# Patient Record
Sex: FEMALE | Race: WHITE | NOT HISPANIC OR LATINO | Employment: UNEMPLOYED | URBAN - METROPOLITAN AREA
[De-identification: names, ages, dates, MRNs, and addresses within clinical notes are randomized per-mention and may not be internally consistent; named-entity substitution may affect disease eponyms.]

---

## 2020-11-09 ENCOUNTER — HOSPITAL ENCOUNTER (EMERGENCY)
Facility: HOSPITAL | Age: 45
Discharge: HOME | End: 2020-11-09
Attending: EMERGENCY MEDICINE
Payer: COMMERCIAL

## 2020-11-09 VITALS
WEIGHT: 130 LBS | HEART RATE: 90 BPM | SYSTOLIC BLOOD PRESSURE: 148 MMHG | BODY MASS INDEX: 23.92 KG/M2 | DIASTOLIC BLOOD PRESSURE: 90 MMHG | OXYGEN SATURATION: 100 % | HEIGHT: 62 IN | TEMPERATURE: 98 F | RESPIRATION RATE: 1 BRPM

## 2020-11-09 DIAGNOSIS — K59.00 CONSTIPATION: ICD-10-CM

## 2020-11-09 DIAGNOSIS — Z87.898 HISTORY OF SEIZURES: ICD-10-CM

## 2020-11-09 DIAGNOSIS — R21 RASH: Primary | ICD-10-CM

## 2020-11-09 LAB
ALBUMIN SERPL BCP-MCNC: 3.8 G/DL (ref 3.5–5.2)
ALP SERPL-CCNC: 95 U/L (ref 55–135)
ALT SERPL W/O P-5'-P-CCNC: 18 U/L (ref 10–44)
AMPHET+METHAMPHET UR QL: NORMAL
ANION GAP SERPL CALC-SCNC: 9 MMOL/L (ref 8–16)
AST SERPL-CCNC: 21 U/L (ref 10–40)
BACTERIA #/AREA URNS HPF: NORMAL /HPF
BARBITURATES UR QL SCN>200 NG/ML: NEGATIVE
BASOPHILS # BLD AUTO: 0.04 K/UL (ref 0–0.2)
BASOPHILS NFR BLD: 0.6 % (ref 0–1.9)
BENZODIAZ UR QL SCN>200 NG/ML: NEGATIVE
BILIRUB SERPL-MCNC: 0.6 MG/DL (ref 0.1–1)
BILIRUB UR QL STRIP: NEGATIVE
BNP SERPL-MCNC: 30 PG/ML (ref 0–99)
BUN SERPL-MCNC: 15 MG/DL (ref 6–20)
BZE UR QL SCN: NEGATIVE
CALCIUM SERPL-MCNC: 9 MG/DL (ref 8.7–10.5)
CANNABINOIDS UR QL SCN: NEGATIVE
CHLORIDE SERPL-SCNC: 106 MMOL/L (ref 95–110)
CLARITY UR: CLEAR
CO2 SERPL-SCNC: 26 MMOL/L (ref 23–29)
COLOR UR: ABNORMAL
CREAT SERPL-MCNC: 0.8 MG/DL (ref 0.5–1.4)
CREAT UR-MCNC: 41.3 MG/DL (ref 15–325)
CTP QC/QA: YES
DIFFERENTIAL METHOD: NORMAL
EOSINOPHIL # BLD AUTO: 0.1 K/UL (ref 0–0.5)
EOSINOPHIL NFR BLD: 1.3 % (ref 0–8)
ERYTHROCYTE [DISTWIDTH] IN BLOOD BY AUTOMATED COUNT: 13.6 % (ref 11.5–14.5)
EST. GFR  (AFRICAN AMERICAN): >60 ML/MIN/1.73 M^2
EST. GFR  (NON AFRICAN AMERICAN): >60 ML/MIN/1.73 M^2
ETHANOL SERPL-MCNC: <10 MG/DL
GLUCOSE SERPL-MCNC: 85 MG/DL (ref 70–110)
GLUCOSE UR QL STRIP: NEGATIVE
HCT VFR BLD AUTO: 39.2 % (ref 37–48.5)
HGB BLD-MCNC: 12.8 G/DL (ref 12–16)
HGB UR QL STRIP: ABNORMAL
IMM GRANULOCYTES # BLD AUTO: 0.03 K/UL (ref 0–0.04)
IMM GRANULOCYTES NFR BLD AUTO: 0.4 % (ref 0–0.5)
KETONES UR QL STRIP: NEGATIVE
LEUKOCYTE ESTERASE UR QL STRIP: NEGATIVE
LYMPHOCYTES # BLD AUTO: 2.1 K/UL (ref 1–4.8)
LYMPHOCYTES NFR BLD: 29.3 % (ref 18–48)
MCH RBC QN AUTO: 28.8 PG (ref 27–31)
MCHC RBC AUTO-ENTMCNC: 32.7 G/DL (ref 32–36)
MCV RBC AUTO: 88 FL (ref 82–98)
METHADONE UR QL SCN>300 NG/ML: NEGATIVE
MICROSCOPIC COMMENT: NORMAL
MONOCYTES # BLD AUTO: 0.6 K/UL (ref 0.3–1)
MONOCYTES NFR BLD: 8.8 % (ref 4–15)
NEUTROPHILS # BLD AUTO: 4.3 K/UL (ref 1.8–7.7)
NEUTROPHILS NFR BLD: 59.6 % (ref 38–73)
NITRITE UR QL STRIP: NEGATIVE
NRBC BLD-RTO: 0 /100 WBC
OPIATES UR QL SCN: NEGATIVE
PCP UR QL SCN>25 NG/ML: NEGATIVE
PH UR STRIP: 6 [PH] (ref 5–8)
PLATELET # BLD AUTO: 230 K/UL (ref 150–350)
PMV BLD AUTO: 10.3 FL (ref 9.2–12.9)
POCT GLUCOSE: 87 MG/DL (ref 70–110)
POTASSIUM SERPL-SCNC: 3.6 MMOL/L (ref 3.5–5.1)
PROT SERPL-MCNC: 6.3 G/DL (ref 6–8.4)
PROT UR QL STRIP: NEGATIVE
RBC # BLD AUTO: 4.45 M/UL (ref 4–5.4)
RBC #/AREA URNS HPF: 1 /HPF (ref 0–4)
SARS-COV-2 RDRP RESP QL NAA+PROBE: NEGATIVE
SODIUM SERPL-SCNC: 141 MMOL/L (ref 136–145)
SP GR UR STRIP: 1 (ref 1–1.03)
TOXICOLOGY INFORMATION: NORMAL
TROPONIN I SERPL DL<=0.01 NG/ML-MCNC: <0.006 NG/ML (ref 0–0.03)
URN SPEC COLLECT METH UR: ABNORMAL
UROBILINOGEN UR STRIP-ACNC: NEGATIVE EU/DL
WBC # BLD AUTO: 7.16 K/UL (ref 3.9–12.7)

## 2020-11-09 PROCEDURE — 84484 ASSAY OF TROPONIN QUANT: CPT

## 2020-11-09 PROCEDURE — 83880 ASSAY OF NATRIURETIC PEPTIDE: CPT

## 2020-11-09 PROCEDURE — 82962 GLUCOSE BLOOD TEST: CPT

## 2020-11-09 PROCEDURE — 99285 EMERGENCY DEPT VISIT HI MDM: CPT | Mod: 25

## 2020-11-09 PROCEDURE — 80307 DRUG TEST PRSMV CHEM ANLYZR: CPT

## 2020-11-09 PROCEDURE — 81000 URINALYSIS NONAUTO W/SCOPE: CPT | Mod: 59

## 2020-11-09 PROCEDURE — 80320 DRUG SCREEN QUANTALCOHOLS: CPT

## 2020-11-09 PROCEDURE — 80053 COMPREHEN METABOLIC PANEL: CPT

## 2020-11-09 PROCEDURE — 85025 COMPLETE CBC W/AUTO DIFF WBC: CPT

## 2020-11-09 PROCEDURE — 51798 US URINE CAPACITY MEASURE: CPT

## 2020-11-09 PROCEDURE — 96374 THER/PROPH/DIAG INJ IV PUSH: CPT

## 2020-11-09 PROCEDURE — 63600175 PHARM REV CODE 636 W HCPCS: Performed by: NURSE PRACTITIONER

## 2020-11-09 PROCEDURE — U0002 COVID-19 LAB TEST NON-CDC: HCPCS | Performed by: NURSE PRACTITIONER

## 2020-11-09 RX ORDER — LISINOPRIL 10 MG/1
10 TABLET ORAL DAILY
COMMUNITY

## 2020-11-09 RX ORDER — LORAZEPAM 2 MG/ML
1 INJECTION INTRAMUSCULAR
Status: COMPLETED | OUTPATIENT
Start: 2020-11-09 | End: 2020-11-09

## 2020-11-09 RX ADMIN — LORAZEPAM 1 MG: 2 INJECTION INTRAMUSCULAR; INTRAVENOUS at 03:11

## 2020-11-09 NOTE — FIRST PROVIDER EVALUATION
Emergency Department TeleTriage Encounter Note      CHIEF COMPLAINT    Chief Complaint   Patient presents with    General Illness     Pt c/o shortness of breathe, head pain, neck pain, body weakness feel like she is going tp pass put secondary a parasite in patients head causing illness. Pt has been dealing with this for about 1 yr and has not been getting better. some partient information is being sent via fax line concerning patient Dx.        VITAL SIGNS   Initial Vitals [11/09/20 1428]   BP Pulse Resp Temp SpO2   (!) 157/92 91 18 98.5 °F (36.9 °C) 98 %      MAP       --            ALLERGIES    Review of patient's allergies indicates:   Allergen Reactions    Azithromycin      Shaking, hives.        PROVIDER TRIAGE NOTE  This is a teletriage evaluation of a 45 y.o. female presenting to the ED with c/o shortness of breath, head pain, and generalized weakness. Reports that she has a parasite. Orders placed by provider at the facility. Care will be transferred to an alternate provider when patient is roomed for a full evaluation. Any additional orders and the final disposition will be determined by that provider.         ORDERS  Labs Reviewed   CBC W/ AUTO DIFFERENTIAL   COMPREHENSIVE METABOLIC PANEL   B-TYPE NATRIURETIC PEPTIDE   TROPONIN I   URINALYSIS, REFLEX TO URINE CULTURE       ED Orders (720h ago, onward)    Start Ordered     Status Ordering Provider    11/09/20 1437 11/09/20 1436  Insert peripheral IV  Continuous      Ordered JESSICA ACEVEDO    11/09/20 1437 11/09/20 1436  CBC auto differential  STAT  Collect    Ordered JESSICA ACEVEDO    11/09/20 1437 11/09/20 1436  Comprehensive metabolic panel  STAT  Collect    Ordered JESSICA ACEVEDO    11/09/20 1437 11/09/20 1436  Brain natriuretic peptide  STAT  Collect    Ordered JESSICA ACEVEDO    11/09/20 1437 11/09/20 1436  Troponin I  STAT  Collect    Ordered JESSICA ACEVEDO    11/09/20 1437 11/09/20 1436  Urinalysis, Reflex to Urine Culture Urine, Clean Catch  STAT       Ordered JESSICA ACEVEDO    11/09/20 1434 11/09/20 1436  CT Head Without Contrast  1 time imaging      Ordered JESSICA ACEVEDO            Virtual Visit Note: The provider triage portion of this emergency department evaluation and documentation was performed via [employer], a HIPAA-compliant telemedicine application, in concert with a tele-presenter in the room. A face to face patient evaluation with one of my colleagues will occur once the patient is placed in an emergency department room.      DISCLAIMER: This note was prepared with [employer] voice recognition transcription software. Garbled syntax, mangled pronouns, and other bizarre constructions may be attributed to that software system.

## 2020-11-09 NOTE — ED NOTES
Pt coming in presenting with gen weakness, malaise, nausea, weight loss. Pt advised all of its due to a parasite in her head and traveling around her body that she has been dealing with for a couple months. Pt does tubbing and mudding a lot and feels playing in that type of water could have caused the parasites. Pt states she feels the around crawling and was seen by another physician.

## 2020-11-10 NOTE — DISCHARGE INSTRUCTIONS
Take all meds as prescribed.  Follow up with infectious disease.  Miralax 17gm in water once a day to reduce constipation.  Return to the Emergency department for any worsening or failure to improve, otherwise follow up with your primary care provider.

## 2020-11-10 NOTE — ED PROVIDER NOTES
Encounter Date: 11/9/2020       History     Chief Complaint   Patient presents with    General Illness     Pt c/o shortness of breathe, head pain, neck pain, body weakness feel like she is going tp pass put secondary a parasite in patients head causing illness. Pt has been dealing with this for about 1 yr and has not been getting better. some partient information is being sent via fax line concerning patient Dx.      HPI   Patient is a 45-year-old female who presents with multiple complaints.  I was originally called to the room because the patient had reported that she felt as though she might have a seizure.  There is a documented history of seizures.  On arrival the patient was on her left side lying.  She reported that she felt as though she might have a seizure secondary to parasites that she felt have been present for approximately 1 year.  She reports that she has headache, body weakness, inability to urinate or defecate, skin lesions secondary to this parasite.  She reports that she has had many medical encounters without relief. 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     Review of patient's allergies indicates:   Allergen Reactions    Azithromycin      Shaking, hives.      Past Medical History:   Diagnosis Date    Hypertension     Seizures      Past Surgical History:   Procedure Laterality Date    CHOLECYSTECTOMY      HYSTERECTOMY      TONSILLECTOMY       History reviewed. No pertinent family history.  Social History     Tobacco Use    Smoking status: Current Some Day Smoker     Packs/day: 0.50     Types: Cigarettes   Substance Use Topics    Alcohol use: Not Currently    Drug use: Yes     Types: Marijuana, Methamphetamines     Comment: years ago      Review of Systems   Constitutional: Positive for chills and fever. Negative for fatigue.   HENT: Negative for congestion, ear discharge, ear pain, postnasal drip, rhinorrhea, sinus  pressure, sneezing, sore throat and voice change.    Eyes: Negative for discharge and itching.   Respiratory: Negative for cough, shortness of breath and wheezing.    Cardiovascular: Negative for chest pain, palpitations and leg swelling.   Gastrointestinal: Positive for abdominal pain. Negative for constipation, diarrhea, nausea and vomiting.        Inability to urinate or defecate   Endocrine: Negative for polydipsia, polyphagia and polyuria.   Genitourinary: Negative for dysuria, frequency, hematuria, urgency, vaginal bleeding, vaginal discharge and vaginal pain.   Musculoskeletal: Negative for arthralgias and myalgias.   Skin: Positive for rash. Negative for wound.   Neurological: Positive for seizures and headaches. Negative for dizziness, syncope, weakness and numbness.   Hematological: Negative for adenopathy. Does not bruise/bleed easily.   Psychiatric/Behavioral: Positive for sleep disturbance. Negative for self-injury and suicidal ideas. The patient is not nervous/anxious.        Physical Exam     Initial Vitals [11/09/20 1428]   BP Pulse Resp Temp SpO2   (!) 157/92 91 18 98.5 °F (36.9 °C) 98 %      MAP       --         Physical Exam    Nursing note and vitals reviewed.  Constitutional: She appears well-developed and well-nourished.   HENT:   Head: Normocephalic and atraumatic.   Right Ear: External ear normal.   Left Ear: External ear normal.   Nose: Nose normal.   Eyes: Conjunctivae and EOM are normal. Pupils are equal, round, and reactive to light. Right eye exhibits no discharge. Left eye exhibits no discharge.   Neck: Normal range of motion.   Cardiovascular: Regular rhythm, S1 normal, S2 normal and normal heart sounds. Exam reveals no gallop.    No murmur heard.  Pulmonary/Chest: Effort normal and breath sounds normal. No respiratory distress. She has no decreased breath sounds. She has no wheezes. She has no rhonchi. She has no rales.   Abdominal: Soft. Normal appearance and bowel sounds are normal.  She exhibits no distension. There is no abdominal tenderness.   Genitourinary: There is no rash or lesion on the right labia. There is no rash or lesion on the left labia.    Genitourinary Comments: External genitalia and anus examined by me with ARIANNA Jacobsen as chaperone.     Musculoskeletal: Normal range of motion.   Neurological: She is alert and oriented to person, place, and time.   Skin: Skin is dry. Capillary refill takes less than 2 seconds.         ED Course   Procedures  Labs Reviewed   URINALYSIS, REFLEX TO URINE CULTURE - Abnormal; Notable for the following components:       Result Value    Occult Blood UA 1+ (*)     All other components within normal limits    Narrative:     Specimen Source->Urine   CBC W/ AUTO DIFFERENTIAL   COMPREHENSIVE METABOLIC PANEL   B-TYPE NATRIURETIC PEPTIDE   TROPONIN I   URINALYSIS MICROSCOPIC    Narrative:     Specimen Source->Urine   DRUG SCREEN PANEL, URINE EMERGENCY    Narrative:     Specimen Source->Urine   ALCOHOL,MEDICAL (ETHANOL)   SARS-COV-2 RDRP GENE   POCT GLUCOSE          Imaging Results          X-Ray Abdomen Flat And Erect (Final result)  Result time 11/09/20 18:18:21    Final result by Silvio Limon MD (11/09/20 18:18:21)                 Impression:      Nonobstructive bowel gas pattern.  Moderate stool seen within the colon.      Electronically signed by: Silvio Limon MD  Date:    11/09/2020  Time:    18:18             Narrative:    EXAMINATION:  XR ABDOMEN FLAT AND ERECT    CLINICAL HISTORY:  Constipation, unspecified    TECHNIQUE:  Flat and erect AP views of the abdomen were performed.    COMPARISON:  None    FINDINGS:  Nonspecific bowel gas pattern.  No evidence to suggest obstruction.  No free air identified.  Moderate volume retained stool is seen within the colon, more prominent within the right colon.  Cholecystectomy clips are seen.  Partially visualized lung bases are clear.                               CT Head Without Contrast (Final result)   Result time 11/09/20 15:08:12    Final result by Manpreet Meyers MD (11/09/20 15:08:12)                 Impression:      No acute large vascular territory infarct or intracranial hemorrhage identified.  Further evaluation/follow-up as warranted.      Electronically signed by: Manpreet Meyers MD  Date:    11/09/2020  Time:    15:08             Narrative:    EXAMINATION:  CT HEAD WITHOUT CONTRAST    CLINICAL HISTORY:  Syncope, simple, abnormal neuro exam;Reports hx of parasite (cysticercosis?);    TECHNIQUE:  Low dose axial CT images obtained throughout the head without intravenous contrast. Sagittal and coronal reconstructions were performed.    COMPARISON:  None.    FINDINGS:  Intracranial compartment: Brain appears normally formed.    Ventricles and sulci are normal in size for age without evidence of hydrocephalus. No extra-axial blood or fluid collections.    The brain parenchyma appears normal. No parenchymal mass, hemorrhage, edema or major vascular distribution infarct.    Skull/extracranial contents (limited evaluation): No fracture. Mastoid air cells and paranasal sinuses are essentially clear.  Imaged portions of the orbits are within normal limits.                                                   ED Course as of Nov 10 1418   Mon Nov 09, 2020   1502 BP(!): 157/92 [VC]   1502 Temp: 98.5 °F (36.9 °C) [VC]   1502 Temp src: Oral [VC]   1502 Pulse: 91 [VC]   1502 Resp: 18 [VC]   1502 SpO2: 98 % [VC]   1507 POCT Glucose: 87 [VC]   1507 BP(!): 150/102 [VC]   1507 Resp: 16 [VC]   1507 SpO2: 100 % [VC]   1507 Initial assessment:  Patient is a 45-year-old female who presents reporting that for the last year she has been infected bite unknown parasites that is causing sores and bumps to her head and is in her pelvis causing difficulty with urination and defecation.  On physical exam the patient is turned on her left side lateral, her eyes are squint shot.  There are sores on her scalp consistent with picking or  scratching.  The abdomen is soft and nontender.  No medical charts are available for review.  Differential diagnosis includes malingering, conversion disorder, cauda equina, psychiatric disorder.  Plan:  CBC, chemistry, troponin, COVID screening, glucose, urinalysis, BNP, CT head (these orders placed by tele triage provider)    [VC]   1511 No acute large vascular territory infarct or intracranial hemorrhage identified.    CT Head Without Contrast [VC]   1602 SARS-CoV-2 RNA, Amplification, Qual: Negative [VC]   1616 CBC: leukocyte count was normal, the H&H was normal. The platelet count was normal.        CBC auto differential [VC]   1618 Negative for infection.   Urinalysis Microscopic [VC]   1627 The chemistry was negative for hypo-or hyper natremia, kalemia, chloridemia, or other electrolyte abnormalities; BUN and creatinine were within normal limits indicating normal kidney function, ALT and AST were within normal limits indicating normal liver function, there was no transaminitis.       Comprehensive metabolic panel [VC]   1647 BNP: 30 [VC]   1648 Alcohol, Serum: <10 [VC]   1648 Troponin I: <0.006 [VC]   1714 Pos amphetamines.   Drug screen panel, emergency [VC]   1724 Pt is sleeping, awakens to verb stim, states she feels better.  SBAR given to Dr. Dyer. Pts guest concerned about inability to defecate or urinate.  Earlier pt urinated 300ml.  Will do PVR and abd flat erect of abd for constipation.    [VC]   1731 Awaiting PVR and abd flat erect.    [VC]   1817 Post void residual was negative.    [VC]   1821 Nonobstructive bowel gas pattern.  Moderate stool seen within the colon.   X-Ray Abdomen Flat And Erect [VC]   1838 SBAR given to patient and to visitor Yuly.  Pt requested I check her perineum for parasites, I did, then I again had a conversation with pt and Yuly regarding normal test results--only abnormal was amphetamine use.  Pt reported she used a few days ago because of fear.     [VC]      ED  Course User Index  [VC] Darron Bahena DNP     The patient was discharged home in good condition to follow-up with infectious disease.  She has seen Dermatology numerous times at outside facilities however I feel that a complete examination by specialist is necessary to rule out parasites and other biological disease processes prior to creating psychological diagnoses.  The patient should return for any worsening or changes in condition.       Clinical Impression:       ICD-10-CM ICD-9-CM   1. Rash  R21 782.1   2. Constipation  K59.00 564.00   3. History of seizures  Z87.898 V12.49                      Disposition:   Disposition: Discharged  Condition: Stable     ED Disposition Condition    Discharge Stable        ED Prescriptions     None        Follow-up Information     Follow up With Specialties Details Why Contact Info    [facility] Schedule an appointment as soon as possible for a visit   [address]  [facility] [address]  [phone]                                         Darron Bahena DNP  11/10/20 1411

## 2020-11-11 ENCOUNTER — OFFICE VISIT (OUTPATIENT)
Dept: INFECTIOUS DISEASES | Facility: CLINIC | Age: 45
End: 2020-11-11
Payer: COMMERCIAL

## 2020-11-11 VITALS
TEMPERATURE: 98 F | HEART RATE: 88 BPM | WEIGHT: 138.44 LBS | DIASTOLIC BLOOD PRESSURE: 101 MMHG | HEIGHT: 62 IN | SYSTOLIC BLOOD PRESSURE: 159 MMHG | BODY MASS INDEX: 25.48 KG/M2

## 2020-11-11 DIAGNOSIS — F41.9 ANXIETY: ICD-10-CM

## 2020-11-11 DIAGNOSIS — F19.10 SUBSTANCE ABUSE: ICD-10-CM

## 2020-11-11 DIAGNOSIS — Z86.69 HISTORY OF SEIZURE DISORDER: ICD-10-CM

## 2020-11-11 DIAGNOSIS — I10 HYPERTENSION, UNSPECIFIED TYPE: ICD-10-CM

## 2020-11-11 DIAGNOSIS — F22 EKBOM'S DELUSIONAL PARASITOSIS: Primary | ICD-10-CM

## 2020-11-11 PROCEDURE — 3008F BODY MASS INDEX DOCD: CPT | Mod: CPTII,S$GLB,, | Performed by: STUDENT IN AN ORGANIZED HEALTH CARE EDUCATION/TRAINING PROGRAM

## 2020-11-11 PROCEDURE — 99205 PR OFFICE/OUTPT VISIT, NEW, LEVL V, 60-74 MIN: ICD-10-PCS | Mod: S$GLB,,, | Performed by: STUDENT IN AN ORGANIZED HEALTH CARE EDUCATION/TRAINING PROGRAM

## 2020-11-11 PROCEDURE — 3008F PR BODY MASS INDEX (BMI) DOCUMENTED: ICD-10-PCS | Mod: CPTII,S$GLB,, | Performed by: STUDENT IN AN ORGANIZED HEALTH CARE EDUCATION/TRAINING PROGRAM

## 2020-11-11 PROCEDURE — 99205 OFFICE O/P NEW HI 60 MIN: CPT | Mod: S$GLB,,, | Performed by: STUDENT IN AN ORGANIZED HEALTH CARE EDUCATION/TRAINING PROGRAM

## 2020-11-11 PROCEDURE — 99999 PR PBB SHADOW E&M-EST. PATIENT-LVL III: ICD-10-PCS | Mod: PBBFAC,,, | Performed by: STUDENT IN AN ORGANIZED HEALTH CARE EDUCATION/TRAINING PROGRAM

## 2020-11-11 PROCEDURE — 99999 PR PBB SHADOW E&M-EST. PATIENT-LVL III: CPT | Mod: PBBFAC,,, | Performed by: STUDENT IN AN ORGANIZED HEALTH CARE EDUCATION/TRAINING PROGRAM

## 2020-11-11 NOTE — PROGRESS NOTES
"INFECTIOUS DISEASE CLINIC  11/11/2020     Subjective:      Chief Complaint:   Chief Complaint   Patient presents with    Delusional     Parasitic infection       History of Present Illness:    This is a 45 y.o. female with hypertension, anxiety, history of trichotillomania, ?seizure d/o and substance abuse who is referred to my clinic for parasitic infection.  Patient is new to me. Pt is accompanied by Yuly who reports is her cousin and Ayesha Garcia (via phone) who states she is her best friend.     Patient states that she initially saw an abscess on the back of her head in June/July - reported that worms came out of the abscesses and have been present since then. She states she can feel them under her skin and wrap around her head, throat, and chest. Patient reports that she saw worms come out of her nose at night and when she blows her nose. Patient provided sample of mucus from nose during visit that did not have evidence of parasites. Also states that she has an oily band that comes across her face and eyes in the morning. Denies recent travel - last traveled to [place] years ago. States that she is currently living with her cousin (Yuly) as she feels that she does not feel safe with her . Pt's cousin stated that her  called doctors in [place] to tell them that patient is "crazy" and that he "drugs" patient with valium. Patient reports that she has tried albendazole, ivermectin and praziquantel in the past without much improvement. Pt denies SI or HI.     Per extensive chart review, patient was seen multiple time in [place] for skin excoriations and alopecia over the past year- has had extensive dermatologic work up including punch biopsy that have been negative for parasitic infections. She has undergone multiple rounds of antibiotics, permethrin cream, and anti-fungal creams without much improvement. Patient was seen recently in ED for multiple complaints - labs notable for positive utox for " amphetamines. CT head negative for acute abnormalities and KUB with moderate stool/no obstruction.     HIV Risks denies  Travel  none recently  Allergies to abx: azithromycin - reports leg tingling/shaking  Drugs: states she uses amphetamines to stay awake; denies IVDU  [place] [facility] [place]    Review of Systems   Constitution: Positive for chills, decreased appetite and weight loss. Negative for fever, malaise/fatigue, night sweats and weight gain.   HENT: Positive for congestion, sore throat and tinnitus.    Eyes: Positive for blurred vision and visual disturbance. Negative for photophobia.   Cardiovascular: Negative for chest pain, leg swelling and palpitations.   Respiratory: Positive for shortness of breath and sputum production. Negative for cough and hemoptysis.    Endocrine: Positive for polydipsia. Negative for polyphagia.   Hematologic/Lymphatic: Negative for bleeding problem. Does not bruise/bleed easily.   Skin: Positive for dry skin, itching, nail changes, rash and unusual hair distribution.   Musculoskeletal: Positive for back pain, muscle weakness and myalgias.   Gastrointestinal: Positive for abdominal pain, anorexia, change in bowel habit and constipation. Negative for nausea and vomiting.   Genitourinary: Negative for dysuria and genital sores.   Neurological: Positive for dizziness and headaches. Negative for light-headedness.   Psychiatric/Behavioral: Positive for substance abuse. Negative for suicidal ideas and thoughts of violence. The patient has insomnia and is nervous/anxious.    Allergic/Immunologic: Negative for environmental allergies and persistent infections.           Past Medical History:   Diagnosis Date    Hypertension     Seizures      Past Surgical History:   Procedure Laterality Date    CHOLECYSTECTOMY      HYSTERECTOMY      TONSILLECTOMY       Family History   Problem Relation Age of Onset    No Known Problems Mother     No Known Problems Father      Social History      Tobacco Use    Smoking status: Current Some Day Smoker     Packs/day: 0.50     Types: Cigarettes   Substance Use Topics    Alcohol use: Not Currently    Drug use: Yes     Types: Marijuana, Methamphetamines     Comment: years ago        Review of patient's allergies indicates:   Allergen Reactions    Azithromycin      Shaking, hives.          Objective:   VS (24h):   Vitals:    11/11/20 1330   BP: (!) 159/101   Pulse: 88   Temp: 98 °F (36.7 °C)         Physical Exam  Vitals signs reviewed.   Constitutional:       Comments: Unkempt     HENT:      Head: Normocephalic and atraumatic.      Right Ear: External ear normal.      Left Ear: External ear normal.      Nose: Nose normal.      Mouth/Throat:      Mouth: Mucous membranes are moist.   Eyes:      General: No scleral icterus.        Right eye: No discharge.         Left eye: No discharge.   Neck:      Musculoskeletal: Neck supple.   Cardiovascular:      Rate and Rhythm: Regular rhythm. Tachycardia present.      Heart sounds: No murmur.   Pulmonary:      Effort: Pulmonary effort is normal. No respiratory distress.      Breath sounds: No stridor. No wheezing, rhonchi or rales.   Chest:      Chest wall: No tenderness.   Abdominal:      General: Bowel sounds are normal. There is no distension.      Palpations: Abdomen is soft. There is no mass.      Tenderness: There is no abdominal tenderness. There is no right CVA tenderness or left CVA tenderness.   Lymphadenopathy:      Cervical: No cervical adenopathy.   Skin:     General: Skin is warm and dry.      Comments: Scabs and excoriations on head and legs  +severe alopecia   Neurological:      Mental Status: She is alert and oriented to person, place, and time.   Psychiatric:         Mood and Affect: Mood is anxious.         Thought Content: Thought content is delusional. Thought content does not include homicidal or suicidal ideation. Thought content does not include homicidal or suicidal plan.  "          Labs:    Recent Labs   Lab Result Units 11/09/20  1518   WBC K/uL 7.16   Hemoglobin g/dL 12.8   Hematocrit % 39.2   Sodium mmol/L 141   Potassium mmol/L 3.6   Chloride mmol/L 106   BUN mg/dL 15   Creatinine mg/dL 0.8   AST U/L 21   ALT U/L 18   Alkaline Phosphatase U/L 95   Total Bilirubin mg/dL 0.6       Medications:  Current Outpatient Medications on File Prior to Visit   Medication Sig Dispense Refill    lisinopriL 10 MG tablet Take 10 mg by mouth once daily.       No current facility-administered medications on file prior to visit.        Micro:   Microbiology x 7d:   Microbiology Results (last 7 days)     ** No results found for the last 168 hours. **          Radiology:     CT head - no acute abnormalities  KUB - moderate stool and no obstruction      There is no immunization history on file for this patient.      Assessment:     44 yo female with substance use, anxiety, HTN, and hx of delusional parasitosis who comes in today for parasites crawling out of her skin.     Plan:     1. Caterina's delusional parasitosis  - Ambulatory referral/consult to Psychiatry; Future  -patient has reported history of delusional parasitosis per chart review and has been resistant in the past to psychiatric assistance. I discussed with patient that she does not have any evidence of parasitic infection and given her heightened anxiety, may benefit from psychiatric evaluation. Pt amenable to calling and setting up an appointment - phone number provided.   -I discussed that based on the lab work and work up that has been done previously, patient does not have a parasitic infection. Pt and friend voiced understanding, though states that she can still feel "them". She acknowledged that she may not have a parasitic infection, but was fixated that there are organisms under her skin that are strangling her from the inside.  -I instructed patient to let the authorities/police know if patient feels unsafe at home. Pt voiced " understanding.     2. Substance abuse  -discussed substance use cessation      3. Anxiety  - Ambulatory referral/consult to Psychiatry; Future  -per chart review, pt was previously on quetiapine, provigil, duloxetine as well anti-epileptic medications  -pt reports that she is currently not on any medications and was last seen by psychiatry a month ago - however would not elaborate further.    4. Hypertension, unspecified type  -pt is on lisinopril but states that she did not take her meds today  -BP elevated in clinic  -discussed with patient that her elevated BP may be exacerbated by her anxiety     5. Hx of seizure d/o  -not currently on AEDs per patient  -follow up with PCP (Dr. Kuo)      These recommendations have been sent to and/or discussed with the following providers:   -Darron Bahena    >50 minutes of face to face time was spent with >50% of the time was spent counseling the patient.       Aracelis Pablo MD  Infectious Disease

## 2020-11-12 ENCOUNTER — TELEPHONE (OUTPATIENT)
Dept: INFECTIOUS DISEASES | Facility: CLINIC | Age: 45
End: 2020-11-12

## 2020-11-12 NOTE — TELEPHONE ENCOUNTER
Called patient with no answer. Then phone automated system stated call cannot be completed at this time.

## 2020-11-12 NOTE — TELEPHONE ENCOUNTER
----- Message from Kina Verena sent at 11/12/2020  4:33 PM CST -----  Contact: tel:  [phone]  Caller: NICOLASA Garcia,    caller says she faxed the info to you as she was instructed and is asking what is the next step?  Asking what more does she need to do?    Pls call today, as per caller.

## 2020-11-12 NOTE — TELEPHONE ENCOUNTER
----- Message from Rina Santamaria sent at 11/12/2020  1:45 PM CST -----  Regarding: Pt Inquiry  Pt Rika ( Power of  ) called stating she has paperwork from PCP ( Diagnosis ) . Requesting a call back       [phone] ( Cell )

## 2020-12-04 ENCOUNTER — HOSPITAL ENCOUNTER (EMERGENCY)
Facility: HOSPITAL | Age: 45
Discharge: HOME | End: 2020-12-04
Attending: EMERGENCY MEDICINE
Payer: COMMERCIAL

## 2020-12-04 VITALS
DIASTOLIC BLOOD PRESSURE: 80 MMHG | RESPIRATION RATE: 23 BRPM | OXYGEN SATURATION: 99 % | HEIGHT: 62 IN | WEIGHT: 138 LBS | HEART RATE: 90 BPM | BODY MASS INDEX: 25.4 KG/M2 | SYSTOLIC BLOOD PRESSURE: 128 MMHG | TEMPERATURE: 98 F

## 2020-12-04 DIAGNOSIS — R07.9 CHEST PAIN: ICD-10-CM

## 2020-12-04 DIAGNOSIS — R06.02 SHORTNESS OF BREATH: Primary | ICD-10-CM

## 2020-12-04 LAB
ALBUMIN SERPL BCP-MCNC: 3.9 G/DL (ref 3.5–5.2)
ALP SERPL-CCNC: 81 U/L (ref 55–135)
ALT SERPL W/O P-5'-P-CCNC: 13 U/L (ref 10–44)
ANION GAP SERPL CALC-SCNC: 9 MMOL/L (ref 8–16)
AST SERPL-CCNC: 18 U/L (ref 10–40)
B-HCG UR QL: NEGATIVE
BASOPHILS # BLD AUTO: 0.04 K/UL (ref 0–0.2)
BASOPHILS NFR BLD: 0.6 % (ref 0–1.9)
BILIRUB SERPL-MCNC: 0.6 MG/DL (ref 0.1–1)
BNP SERPL-MCNC: 14 PG/ML (ref 0–99)
BUN SERPL-MCNC: 13 MG/DL (ref 6–20)
CALCIUM SERPL-MCNC: 9.1 MG/DL (ref 8.7–10.5)
CHLORIDE SERPL-SCNC: 106 MMOL/L (ref 95–110)
CO2 SERPL-SCNC: 26 MMOL/L (ref 23–29)
CREAT SERPL-MCNC: 0.8 MG/DL (ref 0.5–1.4)
CTP QC/QA: YES
DIFFERENTIAL METHOD: NORMAL
EOSINOPHIL # BLD AUTO: 0.1 K/UL (ref 0–0.5)
EOSINOPHIL NFR BLD: 0.9 % (ref 0–8)
ERYTHROCYTE [DISTWIDTH] IN BLOOD BY AUTOMATED COUNT: 13.6 % (ref 11.5–14.5)
EST. GFR  (AFRICAN AMERICAN): >60 ML/MIN/1.73 M^2
EST. GFR  (NON AFRICAN AMERICAN): >60 ML/MIN/1.73 M^2
GLUCOSE SERPL-MCNC: 96 MG/DL (ref 70–110)
HCT VFR BLD AUTO: 39.5 % (ref 37–48.5)
HGB BLD-MCNC: 12.8 G/DL (ref 12–16)
IMM GRANULOCYTES # BLD AUTO: 0.03 K/UL (ref 0–0.04)
IMM GRANULOCYTES NFR BLD AUTO: 0.4 % (ref 0–0.5)
LYMPHOCYTES # BLD AUTO: 2.6 K/UL (ref 1–4.8)
LYMPHOCYTES NFR BLD: 36.9 % (ref 18–48)
MCH RBC QN AUTO: 28.9 PG (ref 27–31)
MCHC RBC AUTO-ENTMCNC: 32.4 G/DL (ref 32–36)
MCV RBC AUTO: 89 FL (ref 82–98)
MONOCYTES # BLD AUTO: 0.4 K/UL (ref 0.3–1)
MONOCYTES NFR BLD: 6.1 % (ref 4–15)
NEUTROPHILS # BLD AUTO: 3.9 K/UL (ref 1.8–7.7)
NEUTROPHILS NFR BLD: 55.1 % (ref 38–73)
NRBC BLD-RTO: 0 /100 WBC
PLATELET # BLD AUTO: 213 K/UL (ref 150–350)
PMV BLD AUTO: 10.1 FL (ref 9.2–12.9)
POTASSIUM SERPL-SCNC: 4 MMOL/L (ref 3.5–5.1)
PROT SERPL-MCNC: 6.5 G/DL (ref 6–8.4)
RBC # BLD AUTO: 4.43 M/UL (ref 4–5.4)
SODIUM SERPL-SCNC: 141 MMOL/L (ref 136–145)
TROPONIN I SERPL DL<=0.01 NG/ML-MCNC: <0.006 NG/ML (ref 0–0.03)
WBC # BLD AUTO: 7.04 K/UL (ref 3.9–12.7)

## 2020-12-04 PROCEDURE — 36000 PLACE NEEDLE IN VEIN: CPT

## 2020-12-04 PROCEDURE — 83880 ASSAY OF NATRIURETIC PEPTIDE: CPT

## 2020-12-04 PROCEDURE — 84484 ASSAY OF TROPONIN QUANT: CPT

## 2020-12-04 PROCEDURE — 85025 COMPLETE CBC W/AUTO DIFF WBC: CPT

## 2020-12-04 PROCEDURE — 99285 EMERGENCY DEPT VISIT HI MDM: CPT | Mod: 25

## 2020-12-04 PROCEDURE — 80053 COMPREHEN METABOLIC PANEL: CPT

## 2020-12-04 PROCEDURE — 81025 URINE PREGNANCY TEST: CPT | Performed by: PHYSICIAN ASSISTANT

## 2020-12-04 PROCEDURE — 93005 ELECTROCARDIOGRAM TRACING: CPT

## 2020-12-04 PROCEDURE — 93010 EKG 12-LEAD: ICD-10-PCS | Mod: ,,, | Performed by: INTERNAL MEDICINE

## 2020-12-04 PROCEDURE — 93010 ELECTROCARDIOGRAM REPORT: CPT | Mod: ,,, | Performed by: INTERNAL MEDICINE

## 2020-12-04 RX ORDER — ASPIRIN 325 MG
325 TABLET ORAL
Status: DISCONTINUED | OUTPATIENT
Start: 2020-12-04 | End: 2020-12-04 | Stop reason: HOSPADM

## 2020-12-04 NOTE — FIRST PROVIDER EVALUATION
" Emergency Department TeleTriage Encounter Note      CHIEF COMPLAINT    Chief Complaint   Patient presents with    Chest Pain     "I want antiparasitic medication". "I've had this parasitic infection all over my head for the past year and its radiating down my neck/back and it's starting to affect my breathing". O2 sats 100% on RA in triage. Reports constant nonradiating midsternal CP that started earlier this evening.     Other       VITAL SIGNS   Initial Vitals [12/04/20 0148]   BP Pulse Resp Temp SpO2   (!) 174/110 102 18 98.4 °F (36.9 °C) 100 %      MAP       --            ALLERGIES    Review of patient's allergies indicates:   Allergen Reactions    Azithromycin      Shaking, hives.        PROVIDER TRIAGE NOTE    Patient is a 45-year-old female with an extensive medical history presenting complaining of chest pain.  In addition, she states that she is experiencing an episode of her parasitic infection.  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 Will order EKG and CXR, defer further orders to ED provider.      ORDERS  Labs Reviewed - No data to display    ED Orders (720h ago, onward)    Start Ordered     Status Ordering Provider    12/04/20 0159 12/04/20 0158  EKG 12-lead  Once      Ordered JAILENE MAYBERRY    12/04/20 0159 12/04/20 0158  X-Ray Chest PA And Lateral  1 time imaging      Ordered JAILENE MAYBERRY    12/04/20 0159 12/04/20 0158  POCT urine pregnancy  Once      Ordered JAILENE MAYBERRY            Virtual Visit Note: The provider triage portion of this emergency department evaluation and documentation was performed via [employer], a HIPAA-compliant telemedicine application, in concert with a tele-presenter in the room. A face to face patient evaluation with one of my colleagues will occur once the patient is placed in an emergency department room.      DISCLAIMER: This note was prepared with M*Modal voice recognition transcription software. Lulu" syntax, mangled pronouns, and other bizarre constructions may be attributed to that software system.

## 2020-12-04 NOTE — ED PROVIDER NOTES
"Encounter Date: 12/4/2020    SCRIBE #1 NOTE: I, Jossy Castleens, am scribing for, and in the presence of,  Kulwinder Ramos MD. I have scribed the entire note.       History     Chief Complaint   Patient presents with    Chest Pain     "I want antiparasitic medication". "I've had this parasitic infection all over my head for the past year and its radiating down my neck/back and it's starting to affect my breathing". O2 sats 100% on RA in triage. Reports constant nonradiating midsternal CP that started earlier this evening.     Other     This is a 44 y/o female with a PMHx of Seizures and Hypertension. She presents to the ED with a CC of shortness of breath and chest pain that began this evening. Patient reports feeling a parasitic infection all over her head that radiates down her neck and back. She states this is what's causing her shortness of breath. Patient has history of complaining of parasites for about a year. No history of blood clots, heart attacks, or strokes.  Allergy to Azithromycin.    The history is provided by the patient.     Review of patient's allergies indicates:   Allergen Reactions    Azithromycin      Shaking, hives.      Past Medical History:   Diagnosis Date    Hypertension     Seizures      Past Surgical History:   Procedure Laterality Date    CHOLECYSTECTOMY      HYSTERECTOMY      TONSILLECTOMY       Family History   Problem Relation Age of Onset    No Known Problems Mother     No Known Problems Father      Social History     Tobacco Use    Smoking status: Current Some Day Smoker     Packs/day: 0.50     Types: Cigarettes   Substance Use Topics    Alcohol use: Not Currently    Drug use: Yes     Types: Marijuana, Methamphetamines     Comment: years ago      Review of Systems   Constitutional: Negative for chills, diaphoresis, fatigue and fever.   HENT: Negative for congestion, sinus pain and sore throat.    Eyes: Negative for pain, discharge, redness and itching. "   Respiratory: Positive for shortness of breath. Negative for cough and wheezing.    Cardiovascular: Positive for chest pain. Negative for palpitations and leg swelling.   Gastrointestinal: Negative for abdominal pain, diarrhea, nausea and vomiting.   Genitourinary: Negative for dysuria and frequency.   Musculoskeletal: Negative for back pain, myalgias and neck pain.   Skin: Negative for rash and wound.   Neurological: Negative for dizziness, syncope and headaches.   Psychiatric/Behavioral: Negative for confusion.       Physical Exam     Initial Vitals [12/04/20 0148]   BP Pulse Resp Temp SpO2   (!) 174/110 102 18 98.4 °F (36.9 °C) 100 %      MAP       --         Physical Exam    Nursing note and vitals reviewed.  Constitutional: Vital signs are normal. She appears well-developed and well-nourished. She is not diaphoretic. No distress.   HENT:   Head: Normocephalic and atraumatic.   Mouth/Throat: Oropharynx is clear and moist.   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 Right eye exhibits no discharge. Left eye exhibits no discharge. No scleral icterus.   Neck: Normal range of motion. Neck supple. No tracheal deviation present. No JVD present.   Cardiovascular: Normal rate, regular rhythm, normal heart sounds, intact distal pulses and normal pulses. Exam reveals no gallop and no friction rub.    No murmur heard.  Pulmonary/Chest: Breath sounds normal. No stridor. No respiratory distress. She has no wheezes. She has no rhonchi. She has no rales. She exhibits no tenderness.   Abdominal: Soft. Bowel sounds are normal. She exhibits no distension and no mass. There is no abdominal tenderness. There is no rebound and no guarding.   Musculoskeletal: Normal range of motion. No tenderness or edema.   Neurological: She is alert and oriented to person, place, and time. She has normal strength. GCS score is 15. GCS eye subscore is 4. GCS verbal subscore is 5. GCS  motor subscore is 6.   MAGGIE with NGND's   Skin: Skin is warm and dry. Capillary refill takes less than 2 seconds. No rash and no abscess noted. No erythema. No pallor.   Psychiatric: She has a normal mood and affect. Her behavior is normal. Judgment and thought content normal.         ED Course   Procedures  Labs Reviewed   POCT URINE PREGNANCY     EKG Readings: (Independently Interpreted)   Initial Reading: No STEMI. Rhythm: Normal Sinus Rhythm. Heart Rate: 97 bpm. Ectopy: No Ectopy. Conduction: Normal. ST Segments: Normal ST Segments. T Waves: Normal. Axis: Normal. Clinical Impression: Normal Sinus Rhythm       Imaging Results    None          Medical Decision Making:   Initial Assessment:   This is a 44 y/o female with a PMHx of Seizures and Hypertension. She presents to the ED with a CC of shortness of breath and chest pain that began this evening. Labs, EKG, and Chest Xray will be ordered. Patient will be treated for symptoms and reevaluated.               Scribe Attestation:   Scribe #1: I performed the above scribed service and the documentation accurately describes the services I performed. I attest to the accuracy of the note.      Pt arrived alert, afebrile, non-toxic in appearance, in no acute respiratory distress with VSS.  EKG revealed no acute findings concerning for ischemia, arrhythmia, heart block or any pathology to warrant cardiology consultation.  CXR revealed no acute pathology.  Labs showed no acute findings.  Low risk for MACE by HEART score.  Secondary exam was unremarkable with no findings to warrant further evaluation at this time.  Pt discharged and counseled on the need to return to the nearest emergency room if they experience any other concerning symptoms.  Pt counseled to F/U outpatient with a PCP over the next week.    Kulwinder Ramos MD                          Clinical Impression:       ICD-10-CM ICD-9-CM   1. Chest pain  R07.9 786.50   2. Chest pain  R07.9 786.50                       Disposition:   Disposition: Discharged  Condition: Stable             I, Kulwinder Ramos, personally performed the services described in this documentation. All medical record entries made by the scribe were at my direction and in my presence.  I have reviewed the chart and agree that the record reflects my personal performance and is accurate and complete.    Kulwinder Ramos MD  12/05/20 [id number]